# Patient Record
Sex: MALE | Race: WHITE | NOT HISPANIC OR LATINO | Employment: FULL TIME | URBAN - METROPOLITAN AREA
[De-identification: names, ages, dates, MRNs, and addresses within clinical notes are randomized per-mention and may not be internally consistent; named-entity substitution may affect disease eponyms.]

---

## 2022-09-12 ENCOUNTER — TELEPHONE (OUTPATIENT)
Dept: INTERNAL MEDICINE | Facility: CLINIC | Age: 32
End: 2022-09-12

## 2022-09-12 NOTE — TELEPHONE ENCOUNTER
"Has virtual Visit schedule today , symptoms of URI including \" hard to breath \" provider prefers that Patient is seen in clinic as it is difficult to assess breathing difficultly over video.    If Patient calls please ask if he can come in to clinic to be seen .      GIOVANNI Short LPN     "

## 2022-09-12 NOTE — TELEPHONE ENCOUNTER
Patient called back and he is having difficulty with breathing specifcally when lying down at night. Unable to sleep.  Informed him that he should be seen to have his lungs assessed and he may need an x-ray based on assessment.  He lives in [place] and unable to drive down to [place].  Offered [facility] in multiple locations around him.    Pt agrees with plan    Sondra VYAS RN, BSN

## 2022-12-25 ENCOUNTER — HEALTH MAINTENANCE LETTER (OUTPATIENT)
Age: 32
End: 2022-12-25

## 2024-02-04 ENCOUNTER — HEALTH MAINTENANCE LETTER (OUTPATIENT)
Age: 34
End: 2024-02-04